# Patient Record
Sex: MALE | Race: WHITE | NOT HISPANIC OR LATINO
[De-identification: names, ages, dates, MRNs, and addresses within clinical notes are randomized per-mention and may not be internally consistent; named-entity substitution may affect disease eponyms.]

---

## 2017-01-12 ENCOUNTER — FORM ENCOUNTER (OUTPATIENT)
Age: 62
End: 2017-01-12

## 2017-02-12 ENCOUNTER — FORM ENCOUNTER (OUTPATIENT)
Age: 62
End: 2017-02-12

## 2022-08-26 DIAGNOSIS — B35.1 TINEA UNGUIUM: ICD-10-CM

## 2022-08-26 DIAGNOSIS — Z80.9 FAMILY HISTORY OF MALIGNANT NEOPLASM, UNSPECIFIED: ICD-10-CM

## 2022-08-26 DIAGNOSIS — L60.2 ONYCHOGRYPHOSIS: ICD-10-CM

## 2022-08-26 DIAGNOSIS — Z87.898 PERSONAL HISTORY OF OTHER SPECIFIED CONDITIONS: ICD-10-CM

## 2022-08-26 DIAGNOSIS — Z98.890 OTHER SPECIFIED POSTPROCEDURAL STATES: ICD-10-CM

## 2022-08-26 DIAGNOSIS — Z86.79 PERSONAL HISTORY OF OTHER DISEASES OF THE CIRCULATORY SYSTEM: ICD-10-CM

## 2022-08-26 DIAGNOSIS — Z87.891 PERSONAL HISTORY OF NICOTINE DEPENDENCE: ICD-10-CM

## 2022-08-26 PROBLEM — Z00.00 ENCOUNTER FOR PREVENTIVE HEALTH EXAMINATION: Status: ACTIVE | Noted: 2022-08-26

## 2022-08-26 RX ORDER — SITAGLIPTIN 50 MG/1
50 TABLET, FILM COATED ORAL
Refills: 0 | Status: ACTIVE | COMMUNITY

## 2022-08-26 RX ORDER — METFORMIN HYDROCHLORIDE 500 MG/1
500 TABLET, COATED ORAL
Refills: 0 | Status: ACTIVE | COMMUNITY

## 2022-09-19 ENCOUNTER — APPOINTMENT (OUTPATIENT)
Dept: PODIATRY | Facility: CLINIC | Age: 67
End: 2022-09-19

## 2022-11-04 ENCOUNTER — APPOINTMENT (OUTPATIENT)
Dept: PODIATRY | Facility: CLINIC | Age: 67
End: 2022-11-04

## 2022-11-22 ENCOUNTER — APPOINTMENT (OUTPATIENT)
Dept: PODIATRY | Facility: CLINIC | Age: 67
End: 2022-11-22

## 2022-12-02 ENCOUNTER — APPOINTMENT (OUTPATIENT)
Dept: PODIATRY | Facility: CLINIC | Age: 67
End: 2022-12-02

## 2022-12-02 DIAGNOSIS — S09.93XA UNSPECIFIED INJURY OF FACE, INITIAL ENCOUNTER: ICD-10-CM

## 2022-12-02 DIAGNOSIS — L60.3 NAIL DYSTROPHY: ICD-10-CM

## 2022-12-02 DIAGNOSIS — F32.A DEPRESSION, UNSPECIFIED: ICD-10-CM

## 2022-12-02 PROCEDURE — 11056 PARNG/CUTG B9 HYPRKR LES 2-4: CPT

## 2022-12-02 PROCEDURE — 11719 TRIM NAIL(S) ANY NUMBER: CPT

## 2022-12-02 RX ORDER — ORAL SEMAGLUTIDE 7 MG/1
7 TABLET ORAL
Qty: 90 | Refills: 0 | Status: ACTIVE | COMMUNITY
Start: 2022-11-08

## 2022-12-02 RX ORDER — FOLIC ACID/MULTIVIT,IRON,MINER 0.4MG-18MG
1200 TABLET ORAL
Qty: 90 | Refills: 0 | Status: ACTIVE | COMMUNITY
Start: 2022-11-08

## 2022-12-02 RX ORDER — HYDROCHLOROTHIAZIDE 12.5 MG/1
12.5 CAPSULE ORAL
Qty: 90 | Refills: 0 | Status: ACTIVE | COMMUNITY
Start: 2022-09-19

## 2022-12-02 RX ORDER — GABAPENTIN 100 MG/1
100 CAPSULE ORAL
Qty: 30 | Refills: 0 | Status: ACTIVE | COMMUNITY
Start: 2022-08-02

## 2022-12-02 RX ORDER — HYDROCHLOROTHIAZIDE 12.5 MG/1
12.5 TABLET ORAL
Qty: 90 | Refills: 0 | Status: ACTIVE | COMMUNITY
Start: 2022-11-08

## 2022-12-02 RX ORDER — BLOOD SUGAR DIAGNOSTIC
STRIP MISCELLANEOUS
Qty: 200 | Refills: 0 | Status: ACTIVE | COMMUNITY
Start: 2022-11-08

## 2022-12-02 RX ORDER — ATORVASTATIN CALCIUM 80 MG/1
80 TABLET, FILM COATED ORAL
Qty: 90 | Refills: 0 | Status: ACTIVE | COMMUNITY
Start: 2022-06-09

## 2022-12-02 RX ORDER — SERTRALINE HYDROCHLORIDE 100 MG/1
100 TABLET, FILM COATED ORAL
Qty: 60 | Refills: 0 | Status: ACTIVE | COMMUNITY
Start: 2022-10-10

## 2022-12-02 RX ORDER — INSULIN HUMAN 8 1/1
8 POWDER, METERED RESPIRATORY (INHALATION)
Qty: 90 | Refills: 0 | Status: ACTIVE | COMMUNITY
Start: 2022-09-19

## 2022-12-02 RX ORDER — INSULIN GLARGINE 300 U/ML
300 INJECTION, SOLUTION SUBCUTANEOUS
Qty: 4 | Refills: 0 | Status: ACTIVE | COMMUNITY
Start: 2022-09-19

## 2022-12-02 RX ORDER — COVID-19 ANTIGEN TEST
KIT MISCELLANEOUS
Qty: 2 | Refills: 0 | Status: ACTIVE | COMMUNITY
Start: 2022-10-14

## 2022-12-02 RX ORDER — MAGNESIUM OXIDE 241.3 MG/1000MG
400 TABLET ORAL
Qty: 30 | Refills: 0 | Status: ACTIVE | COMMUNITY
Start: 2022-08-02

## 2022-12-02 RX ORDER — DAPAGLIFLOZIN 10 MG/1
10 TABLET, FILM COATED ORAL
Qty: 90 | Refills: 0 | Status: ACTIVE | COMMUNITY
Start: 2021-11-16

## 2022-12-02 RX ORDER — TAMSULOSIN HYDROCHLORIDE 0.4 MG/1
0.4 CAPSULE ORAL
Qty: 90 | Refills: 0 | Status: ACTIVE | COMMUNITY
Start: 2021-11-16

## 2022-12-02 RX ORDER — ENALAPRIL MALEATE 20 MG/1
20 TABLET ORAL
Qty: 90 | Refills: 0 | Status: ACTIVE | COMMUNITY
Start: 2022-06-09

## 2022-12-02 RX ORDER — AMANTADINE HYDROCHLORIDE 100 1/1
100 TABLET ORAL
Qty: 30 | Refills: 0 | Status: ACTIVE | COMMUNITY
Start: 2022-09-08

## 2022-12-02 RX ORDER — BACLOFEN 20 MG/1
20 TABLET ORAL
Qty: 30 | Refills: 0 | Status: ACTIVE | COMMUNITY
Start: 2022-08-02

## 2022-12-02 RX ORDER — FLUOROURACIL 50 MG/G
5 CREAM TOPICAL
Qty: 40 | Refills: 0 | Status: ACTIVE | COMMUNITY
Start: 2022-05-27

## 2022-12-02 RX ORDER — SITAGLIPTIN 100 MG/1
100 TABLET, FILM COATED ORAL
Qty: 90 | Refills: 0 | Status: ACTIVE | COMMUNITY
Start: 2022-04-19

## 2022-12-02 RX ORDER — PEN NEEDLE, DIABETIC 32GX 5/32"
32G X 4 MM NEEDLE, DISPOSABLE MISCELLANEOUS
Qty: 100 | Refills: 0 | Status: ACTIVE | COMMUNITY
Start: 2022-06-24

## 2022-12-02 RX ORDER — GLYBURIDE AND METFORMIN HYDROCHLORIDE 5; 500 MG/1; MG/1
5-500 TABLET, FILM COATED ORAL
Qty: 360 | Refills: 0 | Status: ACTIVE | COMMUNITY
Start: 2021-11-16

## 2022-12-02 RX ORDER — FENOFIBRIC ACID 135 MG/1
135 CAPSULE, DELAYED RELEASE ORAL
Qty: 90 | Refills: 0 | Status: ACTIVE | COMMUNITY
Start: 2022-04-19

## 2022-12-02 RX ORDER — CELECOXIB 200 MG/1
200 CAPSULE ORAL
Qty: 60 | Refills: 0 | Status: ACTIVE | COMMUNITY
Start: 2022-08-02

## 2022-12-12 PROBLEM — L60.3 NAIL DYSTROPHY: Status: ACTIVE | Noted: 2022-12-06

## 2022-12-12 NOTE — HISTORY OF PRESENT ILLNESS
[Sneakers] : nish [FreeTextEntry1] : 67 year old male presents today for management of painful, elongated toenails as well as painful IPK's.  Patient is diabetic.  He did not check his finger stick today.  Last A1c was 8.1% when he saw Dr. Domínguez 2 months ago.  \par

## 2022-12-12 NOTE — ASSESSMENT
[FreeTextEntry1] : Impression: Diabetes with neuropathy (E11.42).  Painful IPK's (L85.1).  Hammertoes (M20.4).  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  His current shoes have a toebox that is too narrow for his deformities.  I gave the patient a script for diabetic shoes and a list of participating vendors.  \par Nails 1 through 5, bilateral were trimmed to hygienic length.  The IPK's were shaved with a sterile #23 blade.  The procedure was tolerated well without complications.  Failure to perform this treatment based on patient's underlying condition could lead to ulceration and infection.\par Return: 3 months.  \par \par

## 2022-12-12 NOTE — PHYSICAL EXAM
[Ankle Swelling Bilaterally] : bilaterally  [2+] : left foot dorsalis pedis 2+ [Ankle Swelling (On Exam)] : not present [FreeTextEntry3] : CFT: 3 seconds x 10.  Temperature gradient: warm to cool. [de-identified] : Hammertoes 1 through 5, bilateral with prominent bilateral hallux, dorsomedial IPJ with no pain or preulcerative lesions.   [Vibration Dec.] : normal vibratory sensation at the level of the toes [Position Sense Dec.] : normal position sense at the level of the toes [Diminished Throughout Right Foot] : normal sensation with monofilament testing throughout right foot [Diminished Throughout Left Foot] : normal sensation with monofilament testing throughout left foot [FreeTextEntry1] : Q9-The patient has a class finding of Q9-One Class B and 2 Class C findings

## 2023-03-02 ENCOUNTER — APPOINTMENT (OUTPATIENT)
Dept: PODIATRY | Facility: CLINIC | Age: 68
End: 2023-03-02

## 2023-08-28 ENCOUNTER — APPOINTMENT (OUTPATIENT)
Dept: PODIATRY | Facility: CLINIC | Age: 68
End: 2023-08-28
Payer: COMMERCIAL

## 2023-08-28 PROCEDURE — 11056 PARNG/CUTG B9 HYPRKR LES 2-4: CPT

## 2023-08-28 PROCEDURE — 99212 OFFICE O/P EST SF 10 MIN: CPT | Mod: 25

## 2023-09-01 NOTE — PHYSICAL EXAM
[Ankle Swelling Bilaterally] : bilaterally  [1+] : left foot dorsalis pedis 1+ [Ankle Swelling (On Exam)] : not present [FreeTextEntry3] : Temperature gradient within normal limits. CFT: 4 seconds x10  [de-identified] : Hammertoes 1 through 5 bilaterally. There is a hammered hallux of the right foot with a prominent bony exostosis of the IPJ, dorsal medial aspect of the right hallux. Negative calor. Negative fluctuance or preulcerative lesions are noted. Prominent metatarsal heads 1 through 4 bilateral. [FreeTextEntry1] : Negative edema, erythema or interdigital macerations. Nails are short and appropriate length at this time. There is a painful IPK at the medial distal aspect of the 5th digit and proximal PIPJ of the 4th digit left foot. Negative ulceration, foreign body or acute signs of infection. Negative blistering or breaks in skin.  [Vibration Dec.] : normal vibratory sensation at the level of the toes
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
## 2023-12-08 ENCOUNTER — APPOINTMENT (OUTPATIENT)
Dept: PODIATRY | Facility: CLINIC | Age: 68
End: 2023-12-08
Payer: COMMERCIAL

## 2023-12-08 DIAGNOSIS — L85.1 ACQUIRED KERATOSIS [KERATODERMA] PALMARIS ET PLANTARIS: ICD-10-CM

## 2023-12-08 PROCEDURE — 99212 OFFICE O/P EST SF 10 MIN: CPT

## 2023-12-11 PROBLEM — L85.1 KERATODERMA, ACQUIRED: Status: ACTIVE | Noted: 2023-08-30

## 2023-12-15 NOTE — PHYSICAL EXAM
[Ankle Swelling (On Exam)] : not present [FreeTextEntry3] : Temperature gradient within normal limits. CFT: 4 seconds x10  [FreeTextEntry1] : Negative edema, erythema or interdigital macerations. The nails are short at this time and cut to appropriate length. There are painful IPK's in the medial aspect of the 5th digit DIPJ and proximal 4th digit PIPJ with negative ulcerations, foreign bodies, blistering or signs of acute infection. There is also a hyperkeratotic IPK on the distal DIPJ of the 5th toe right foot and dorsal PIPJ of the 5th toe right foot. [Diminished Throughout Right Foot] : normal sensation with monofilament testing throughout right foot [Diminished Throughout Left Foot] : normal sensation with monofilament testing throughout left foot [FreeTextEntry4] :   decreased vibratory at the 1st MPJ [FreeTextEntry8] : decreased vibratory at the 1st MPJ

## 2023-12-15 NOTE — ASSESSMENT
[FreeTextEntry1] : Impression: Diabetic with neurological manifestations. Hammertoes left and right. IPK.  Treatment: I discussed findings and conditions with the patient. He is to avoid walking barefoot. Shoe gear was again discussed today. With patient's consent, lesions were prepped and debrided and trimmed to patient's tolerance with a sterile #15 blade without incidence. Foam toe separators are to be used at all times especially in shoe gear to avoid excessive pressure to the area, avoid increase in hyperkeratosis and decrease the risk of ulcers or blistering. Patient is to return in 2 to 3 months. Any pain, problems or concerns, he is to contact the office.

## 2023-12-15 NOTE — HISTORY OF PRESENT ILLNESS
[FreeTextEntry1] : Patient presents today for follow-up and diabetic pedal exam. He complains of pain between the 4th and 5th toes, corns develop. He has not been wearing his toe separators as he usually does because he states the pain has been less over the past several months. He does present today wearing a tighter fitting type shoe. He denies any additional changes to medication or medical conditions. The patient's finger sticks have been ranging anywhere between 50 to 130-140 as per the patient, depending on what he eats. He does have a follow-up with his medical doctor in the next 1 to 2 months.

## 2024-02-02 ENCOUNTER — APPOINTMENT (OUTPATIENT)
Dept: PODIATRY | Facility: CLINIC | Age: 69
End: 2024-02-02

## 2024-04-22 ENCOUNTER — APPOINTMENT (OUTPATIENT)
Dept: PODIATRY | Facility: CLINIC | Age: 69
End: 2024-04-22

## 2024-06-21 ENCOUNTER — APPOINTMENT (OUTPATIENT)
Dept: PODIATRY | Facility: CLINIC | Age: 69
End: 2024-06-21
Payer: MEDICARE

## 2024-06-21 DIAGNOSIS — Q82.8 OTHER SPECIFIED CONGENITAL MALFORMATIONS OF SKIN: ICD-10-CM

## 2024-06-21 DIAGNOSIS — I70.91 GENERALIZED ATHEROSCLEROSIS: ICD-10-CM

## 2024-06-21 DIAGNOSIS — E11.40 TYPE 2 DIABETES MELLITUS WITH DIABETIC NEUROPATHY, UNSPECIFIED: ICD-10-CM

## 2024-06-21 DIAGNOSIS — M79.674 PAIN IN RIGHT TOE(S): ICD-10-CM

## 2024-06-21 DIAGNOSIS — M20.41 OTHER HAMMER TOE(S) (ACQUIRED), RIGHT FOOT: ICD-10-CM

## 2024-06-21 DIAGNOSIS — M79.675 PAIN IN LEFT TOE(S): ICD-10-CM

## 2024-06-21 DIAGNOSIS — M20.42 OTHER HAMMER TOE(S) (ACQUIRED), LEFT FOOT: ICD-10-CM

## 2024-06-21 PROCEDURE — 99202 OFFICE O/P NEW SF 15 MIN: CPT

## 2024-06-24 PROBLEM — M79.674 PAIN OF TOE OF RIGHT FOOT: Status: ACTIVE | Noted: 2024-06-24

## 2024-06-24 PROBLEM — I70.91 ATHEROSCLEROSIS, GENERALIZED: Status: ACTIVE | Noted: 2024-06-24

## 2024-06-24 PROBLEM — Q82.8 PLANTAR KERATOSIS: Status: ACTIVE | Noted: 2022-08-26

## 2024-06-24 PROBLEM — M20.42 OTHER HAMMER TOE(S) (ACQUIRED), LEFT FOOT: Status: ACTIVE | Noted: 2022-08-26

## 2024-06-24 PROBLEM — M79.675 PAIN OF TOE OF LEFT FOOT: Status: ACTIVE | Noted: 2024-06-24

## 2024-06-24 PROBLEM — M20.41 OTHER HAMMER TOE(S) (ACQUIRED), RIGHT FOOT: Status: ACTIVE | Noted: 2022-08-26

## 2024-06-24 PROBLEM — E11.40 TYPE 2 DIABETES MELLITUS WITH DIABETIC NEUROPATHY: Status: ACTIVE | Noted: 2022-08-26

## 2024-06-27 NOTE — HISTORY OF PRESENT ILLNESS
[FreeTextEntry1] : Patient presents today with a complaint of pain in his left toe.  He states that he has pain, on and off, for several days.  He is diabetic and was concerned about an infection.  Fingersticks are around 130; he does not know his most recent hemoglobin A1c.  He saw his medical doctor several weeks ago.  At that time, the patient had started to complain of cramping in his legs, which is new onset and was sent for vascular workup.  He states that he has an appointment on Monday with the interventional radiologist to evaluate the lower extremities arterial status.  He has not seen a vascular doctor.  The orders were placed by his medical doctor.

## 2024-06-27 NOTE — PHYSICAL EXAM
[Ankle Swelling Bilaterally] : bilaterally  [1+] : left foot dorsalis pedis 1+ [Ankle Swelling (On Exam)] : not present [FreeTextEntry3] : CFT: 4 seconds x 10.  Temperature gradient: cooler distally.    [de-identified] : Rigid hammertoe deformities 2 to 5, bilateral.  Mild HAV and hammered hallux, bilateral.  Plantar flexed metatarsal heads, bilaterally with mild decrease in ankle joint ROM, bilaterally. [FreeTextEntry1] : Negative erythema or interdigital macerations.  Negative hair growth.  Skin turgor is decreased.  The nails are short and cut to appropriate length.  Painful IPK's, medial aspect of the 5th digit DIPJ and proximal 4th digit PIPJ, left foot.  Negative ulcerations, blistering, breaks in skin, skin fissures or signs of acute bacterial infection.  There is tenderness on applied pressure but negative calor, fluctuance, or malodor.  Negative ascending cellulitis.   [Diminished Throughout Right Foot] : normal sensation with monofilament testing throughout right foot [Diminished Throughout Left Foot] : normal sensation with monofilament testing throughout left foot [FreeTextEntry4] : decreased vibratory at the 1st MPJ.   [FreeTextEntry8] : decreased vibratory at the 1st MPJ.

## 2024-06-27 NOTE — ASSESSMENT
[FreeTextEntry1] : Impression: Diabetes with neuropathy (E11.40) and circulatory complications (I70.91).  Hammertoes (M20.41, M20.42).  IPK (Q82.8).  Pain in toes (M79.674, M79.675).    Treatment: Discussed findings and conditions with the patient.  Discussed diabetic pedal care.  Examine feet daily, visually.  Moisturize as needed.  Avoid walking barefoot.  Also, recommended that he change his shoe that he presents today wearing a loafer type or narrow boat type sneaker which is not accommodative for his hammertoe deformities and foot type and is causing additional pressure on the digits resulting in callus and IPK's, which can lead to ulcerations and infections.  Discussed with patient in detail.  Recommended a regular sneaker with a higher toebox. With patient's consent, the lesion was prepped and trimmed with a sterile #15 blade.  Foam separator was applied.  He is to follow up with vascular studies and his medical doctor.   Return: 6 - 8 months for follow up.  With any redness, pain, problems or concerns, patient is to contact the office.

## 2024-08-02 ENCOUNTER — APPOINTMENT (OUTPATIENT)
Dept: PODIATRY | Facility: CLINIC | Age: 69
End: 2024-08-02
Payer: MEDICARE

## 2024-08-02 DIAGNOSIS — E11.59 TYPE 2 DIABETES MELLITUS WITH OTHER CIRCULATORY COMPLICATIONS: ICD-10-CM

## 2024-08-02 DIAGNOSIS — M20.41 OTHER HAMMER TOE(S) (ACQUIRED), RIGHT FOOT: ICD-10-CM

## 2024-08-02 DIAGNOSIS — E11.40 TYPE 2 DIABETES MELLITUS WITH DIABETIC NEUROPATHY, UNSPECIFIED: ICD-10-CM

## 2024-08-02 DIAGNOSIS — M20.42 OTHER HAMMER TOE(S) (ACQUIRED), LEFT FOOT: ICD-10-CM

## 2024-08-02 PROCEDURE — 99212 OFFICE O/P EST SF 10 MIN: CPT

## 2024-08-06 PROBLEM — E11.59 CONTROLLED DIABETES MELLITUS WITH CIRCULATORY COMPLICATION: Status: ACTIVE | Noted: 2024-08-06

## 2024-08-09 NOTE — PHYSICAL EXAM
[Ankle Swelling Bilaterally] : bilaterally  [1+] : left foot dorsalis pedis 1+ [Ankle Swelling (On Exam)] : not present [FreeTextEntry3] : CFT: 4 seconds x 10.  Temperature gradient: cooler distally.    [de-identified] : Rigid hammertoe deformities 2 to 5, bilateral.  Mild HAV and hammered hallux, bilateral.  Plantar flexed metatarsal heads, bilaterally with mild decrease in ankle joint ROM, bilaterally. [FreeTextEntry1] : Negative edema, erythema or interdigital macerations.  Negative hair growth.  Skin turgor is decreased.  Painful IPK on the medial aspect of the 5th digit, left foot and medially and the lateral aspect of the 4th PIPJ of that left foot as well.  Negative ulcerations, blisters, calor or breaks in skin.  Negative fissuring.    The nails are short and cut to appropriate length.  Painful IPK's, medial aspect of the 5th digit DIPJ and proximal 4th digit PIPJ, left foot.  Negative ulcerations, blistering, breaks in skin, skin fissures or signs of acute bacterial infection.  There is tenderness on applied pressure but negative calor, fluctuance, or malodor.  Negative ascending cellulitis.   [Diminished Throughout Right Foot] : normal sensation with monofilament testing throughout right foot [Diminished Throughout Left Foot] : normal sensation with monofilament testing throughout left foot [FreeTextEntry4] : decreased vibratory at the 1st MPJ.   [FreeTextEntry8] : decreased vibratory at the 1st MPJ.

## 2024-08-09 NOTE — ASSESSMENT
[FreeTextEntry1] : Impression: Diabetes with neuropathy (E11.40) and circulatory complications (I70.91).  Hammertoes (M20.41, M20.42).    Treatment: Discussed findings and conditions with the patient.  Discussed diabetic pedal care.  Patient did purchase wider shoes but today he presents wearing a loafer type again, which was narrow and shallow for his foot type.  Explained the importance of changing his shoe gear to avoid additional pressure to his foot, especially with the hammered hallux on the right toe, dorsal prominent as well as it can cause a breakdown of skin.   The lesion was prepped and trimmed with a sterile #15 blade.  Toe separator was applied to the 4th interspace.  He is to purchase additional toe separators and wear them daily especially in shoe gear to prevent further buildup of hyperkeratotic lesions, which may also break down good skin and cause blistering and risks of ulcerations.  Patient states that he understands.  He is not interested in any surgical correction of the hammertoe at this time.  Discussed continuation of conservative treatment with the patient.   Return: As needed.  With any redness, pain, problems or concerns, patient is to contact the office.  He is to follow up in 2 months for a podiatry exam.  If he needs an earlier appointment, he will contact the office.

## 2024-08-09 NOTE — PHYSICAL EXAM
[Ankle Swelling Bilaterally] : bilaterally  [1+] : left foot dorsalis pedis 1+ [Ankle Swelling (On Exam)] : not present [FreeTextEntry3] : CFT: 4 seconds x 10.  Temperature gradient: cooler distally.    [de-identified] : Rigid hammertoe deformities 2 to 5, bilateral.  Mild HAV and hammered hallux, bilateral.  Plantar flexed metatarsal heads, bilaterally with mild decrease in ankle joint ROM, bilaterally. [FreeTextEntry1] : Negative edema, erythema or interdigital macerations.  Negative hair growth.  Skin turgor is decreased.  Painful IPK on the medial aspect of the 5th digit, left foot and medially and the lateral aspect of the 4th PIPJ of that left foot as well.  Negative ulcerations, blisters, calor or breaks in skin.  Negative fissuring.    The nails are short and cut to appropriate length.  Painful IPK's, medial aspect of the 5th digit DIPJ and proximal 4th digit PIPJ, left foot.  Negative ulcerations, blistering, breaks in skin, skin fissures or signs of acute bacterial infection.  There is tenderness on applied pressure but negative calor, fluctuance, or malodor.  Negative ascending cellulitis.   [Diminished Throughout Right Foot] : normal sensation with monofilament testing throughout right foot [Diminished Throughout Left Foot] : normal sensation with monofilament testing throughout left foot [FreeTextEntry4] : decreased vibratory at the 1st MPJ.   [FreeTextEntry8] : decreased vibratory at the 1st MPJ.

## 2024-08-09 NOTE — HISTORY OF PRESENT ILLNESS
[FreeTextEntry1] : Patient presents today for diabetic pedal exam and a complaint of pain in his 5th toe, left foot.  His most recent hemoglobin A1c was 7%.  Fingerstick: 122 which was on Tuesday.  He does not check it regularly.  Patient had an appointment with the vascular doctor but has not followed up regarding the testing that was performed.  It showed that he may have some lower extremity arterial disease.  Also, has not followed up with the vascular doctor for carotid dopplers or any other vascular workup and cardiology workup at this time.  He denies any changes to his medical conditions or medications.  Denies any additional complaints in his legs or feet; he states that some of his complaints have resolved.

## 2024-08-09 NOTE — PHYSICAL EXAM
[Ankle Swelling Bilaterally] : bilaterally  [1+] : left foot dorsalis pedis 1+ [Ankle Swelling (On Exam)] : not present [FreeTextEntry3] : CFT: 4 seconds x 10.  Temperature gradient: cooler distally.    [de-identified] : Rigid hammertoe deformities 2 to 5, bilateral.  Mild HAV and hammered hallux, bilateral.  Plantar flexed metatarsal heads, bilaterally with mild decrease in ankle joint ROM, bilaterally. [FreeTextEntry1] : Negative edema, erythema or interdigital macerations.  Negative hair growth.  Skin turgor is decreased.  Painful IPK on the medial aspect of the 5th digit, left foot and medially and the lateral aspect of the 4th PIPJ of that left foot as well.  Negative ulcerations, blisters, calor or breaks in skin.  Negative fissuring.    The nails are short and cut to appropriate length.  Painful IPK's, medial aspect of the 5th digit DIPJ and proximal 4th digit PIPJ, left foot.  Negative ulcerations, blistering, breaks in skin, skin fissures or signs of acute bacterial infection.  There is tenderness on applied pressure but negative calor, fluctuance, or malodor.  Negative ascending cellulitis.   [Diminished Throughout Right Foot] : normal sensation with monofilament testing throughout right foot [Diminished Throughout Left Foot] : normal sensation with monofilament testing throughout left foot [FreeTextEntry4] : decreased vibratory at the 1st MPJ.   [FreeTextEntry8] : decreased vibratory at the 1st MPJ.

## 2024-10-25 ENCOUNTER — APPOINTMENT (OUTPATIENT)
Dept: PODIATRY | Facility: CLINIC | Age: 69
End: 2024-10-25

## 2024-10-25 DIAGNOSIS — E11.59 TYPE 2 DIABETES MELLITUS WITH OTHER CIRCULATORY COMPLICATIONS: ICD-10-CM

## 2024-10-25 DIAGNOSIS — L60.3 NAIL DYSTROPHY: ICD-10-CM

## 2024-10-25 DIAGNOSIS — E11.40 TYPE 2 DIABETES MELLITUS WITH DIABETIC NEUROPATHY, UNSPECIFIED: ICD-10-CM

## 2024-10-25 DIAGNOSIS — L85.1 ACQUIRED KERATOSIS [KERATODERMA] PALMARIS ET PLANTARIS: ICD-10-CM

## 2024-10-25 PROCEDURE — 11056 PARNG/CUTG B9 HYPRKR LES 2-4: CPT

## 2024-10-25 PROCEDURE — 11719 TRIM NAIL(S) ANY NUMBER: CPT | Mod: 59

## 2024-10-28 PROBLEM — L85.1 PLANTAR KERATOSIS, ACQUIRED: Status: ACTIVE | Noted: 2024-10-28

## 2025-01-10 ENCOUNTER — APPOINTMENT (OUTPATIENT)
Dept: PODIATRY | Facility: CLINIC | Age: 70
End: 2025-01-10
Payer: MEDICARE

## 2025-01-10 DIAGNOSIS — L85.1 ACQUIRED KERATOSIS [KERATODERMA] PALMARIS ET PLANTARIS: ICD-10-CM

## 2025-01-10 DIAGNOSIS — M20.41 OTHER HAMMER TOE(S) (ACQUIRED), RIGHT FOOT: ICD-10-CM

## 2025-01-10 DIAGNOSIS — M20.42 OTHER HAMMER TOE(S) (ACQUIRED), LEFT FOOT: ICD-10-CM

## 2025-01-10 DIAGNOSIS — L85.3 XEROSIS CUTIS: ICD-10-CM

## 2025-01-10 PROCEDURE — 99213 OFFICE O/P EST LOW 20 MIN: CPT

## 2025-01-13 PROBLEM — L85.3 XEROSIS CUTIS: Status: ACTIVE | Noted: 2025-01-13

## 2025-03-21 ENCOUNTER — APPOINTMENT (OUTPATIENT)
Dept: PODIATRY | Facility: CLINIC | Age: 70
End: 2025-03-21
Payer: MEDICARE

## 2025-03-21 DIAGNOSIS — M79.675 PAIN IN LEFT TOE(S): ICD-10-CM

## 2025-03-21 DIAGNOSIS — E11.40 TYPE 2 DIABETES MELLITUS WITH DIABETIC NEUROPATHY, UNSPECIFIED: ICD-10-CM

## 2025-03-21 DIAGNOSIS — M20.41 OTHER HAMMER TOE(S) (ACQUIRED), RIGHT FOOT: ICD-10-CM

## 2025-03-21 DIAGNOSIS — I70.91 GENERALIZED ATHEROSCLEROSIS: ICD-10-CM

## 2025-03-21 DIAGNOSIS — M20.42 OTHER HAMMER TOE(S) (ACQUIRED), LEFT FOOT: ICD-10-CM

## 2025-03-21 DIAGNOSIS — M79.674 PAIN IN RIGHT TOE(S): ICD-10-CM

## 2025-03-21 PROCEDURE — 99213 OFFICE O/P EST LOW 20 MIN: CPT

## 2025-05-29 ENCOUNTER — APPOINTMENT (OUTPATIENT)
Dept: PODIATRY | Facility: CLINIC | Age: 70
End: 2025-05-29

## 2025-05-29 DIAGNOSIS — M20.41 OTHER HAMMER TOE(S) (ACQUIRED), RIGHT FOOT: ICD-10-CM

## 2025-05-29 DIAGNOSIS — M79.674 PAIN IN RIGHT TOE(S): ICD-10-CM

## 2025-05-29 DIAGNOSIS — L85.1 ACQUIRED KERATOSIS [KERATODERMA] PALMARIS ET PLANTARIS: ICD-10-CM

## 2025-05-29 DIAGNOSIS — M20.42 OTHER HAMMER TOE(S) (ACQUIRED), LEFT FOOT: ICD-10-CM

## 2025-05-29 DIAGNOSIS — I70.91 GENERALIZED ATHEROSCLEROSIS: ICD-10-CM

## 2025-05-29 DIAGNOSIS — M79.675 PAIN IN LEFT TOE(S): ICD-10-CM

## 2025-05-29 DIAGNOSIS — E11.40 TYPE 2 DIABETES MELLITUS WITH DIABETIC NEUROPATHY, UNSPECIFIED: ICD-10-CM

## 2025-05-29 PROCEDURE — 99212 OFFICE O/P EST SF 10 MIN: CPT | Mod: 25

## 2025-05-29 PROCEDURE — 11056 PARNG/CUTG B9 HYPRKR LES 2-4: CPT

## 2025-06-02 PROBLEM — I70.91 GENERALIZED ATHEROSCLEROSIS: Status: ACTIVE | Noted: 2025-06-02

## 2025-09-02 ENCOUNTER — NON-APPOINTMENT (OUTPATIENT)
Age: 70
End: 2025-09-02

## 2025-09-04 ENCOUNTER — APPOINTMENT (OUTPATIENT)
Dept: PODIATRY | Facility: CLINIC | Age: 70
End: 2025-09-04

## 2025-09-04 DIAGNOSIS — M20.42 OTHER HAMMER TOE(S) (ACQUIRED), LEFT FOOT: ICD-10-CM

## 2025-09-04 DIAGNOSIS — M20.41 OTHER HAMMER TOE(S) (ACQUIRED), RIGHT FOOT: ICD-10-CM

## 2025-09-04 DIAGNOSIS — E11.40 TYPE 2 DIABETES MELLITUS WITH DIABETIC NEUROPATHY, UNSPECIFIED: ICD-10-CM

## 2025-09-04 DIAGNOSIS — L85.1 ACQUIRED KERATOSIS [KERATODERMA] PALMARIS ET PLANTARIS: ICD-10-CM

## 2025-09-04 PROCEDURE — 99213 OFFICE O/P EST LOW 20 MIN: CPT

## 2025-09-13 ENCOUNTER — NON-APPOINTMENT (OUTPATIENT)
Age: 70
End: 2025-09-13